# Patient Record
Sex: MALE | Race: WHITE | Employment: OTHER | URBAN - METROPOLITAN AREA
[De-identification: names, ages, dates, MRNs, and addresses within clinical notes are randomized per-mention and may not be internally consistent; named-entity substitution may affect disease eponyms.]

---

## 2023-11-02 PROBLEM — M16.10 HIP ARTHRITIS: Status: ACTIVE | Noted: 2023-11-02

## 2023-11-02 RX ORDER — TRAZODONE HYDROCHLORIDE 100 MG/1
TABLET ORAL
COMMUNITY

## 2023-11-02 RX ORDER — ATORVASTATIN CALCIUM 40 MG/1
TABLET, FILM COATED ORAL
COMMUNITY

## 2023-11-02 RX ORDER — HYDROCODONE BITARTRATE AND ACETAMINOPHEN 5; 325 MG/1; MG/1
TABLET ORAL
COMMUNITY
Start: 2021-08-02

## 2023-11-02 RX ORDER — CLOPIDOGREL BISULFATE 75 MG/1
1 TABLET ORAL DAILY
COMMUNITY

## 2023-11-02 RX ORDER — METOPROLOL SUCCINATE 50 MG/1
TABLET, EXTENDED RELEASE ORAL
COMMUNITY

## 2023-11-02 RX ORDER — METOPROLOL TARTRATE 25 MG/1
TABLET, FILM COATED ORAL
COMMUNITY

## 2023-11-02 RX ORDER — LEVOTHYROXINE SODIUM 50 UG/1
TABLET ORAL
COMMUNITY

## 2023-11-02 RX ORDER — TRAMADOL HYDROCHLORIDE 50 MG/1
TABLET ORAL
COMMUNITY
Start: 2021-08-02

## 2023-11-02 RX ORDER — ASPIRIN 81 MG/1
TABLET ORAL
COMMUNITY

## 2023-11-02 RX ORDER — OXYCODONE AND ACETAMINOPHEN 10; 325 MG/1; MG/1
TABLET ORAL
COMMUNITY

## 2023-11-02 RX ORDER — CHOLECALCIFEROL (VITAMIN D3) 50 MCG
TABLET ORAL
COMMUNITY

## 2023-11-03 ENCOUNTER — OFFICE VISIT (OUTPATIENT)
Dept: OTOLARYNGOLOGY | Facility: CLINIC | Age: 83
End: 2023-11-03
Payer: MEDICARE

## 2023-11-03 ENCOUNTER — CLINICAL SUPPORT (OUTPATIENT)
Dept: AUDIOLOGY | Facility: CLINIC | Age: 83
End: 2023-11-03
Payer: MEDICARE

## 2023-11-03 VITALS — WEIGHT: 117.2 LBS | HEIGHT: 65 IN | BODY MASS INDEX: 19.53 KG/M2 | TEMPERATURE: 96.9 F

## 2023-11-03 DIAGNOSIS — Z96.22 S/P TUBE MYRINGOTOMY: Primary | ICD-10-CM

## 2023-11-03 DIAGNOSIS — H90.A31 MIXED CONDUCTIVE AND SENSORINEURAL HEARING LOSS OF RIGHT EAR WITH RESTRICTED HEARING OF LEFT EAR: ICD-10-CM

## 2023-11-03 DIAGNOSIS — H69.91 DYSFUNCTION OF RIGHT EUSTACHIAN TUBE: Primary | ICD-10-CM

## 2023-11-03 DIAGNOSIS — H90.A31 MIXED CONDUCTIVE AND SENSORINEURAL HEARING LOSS OF RIGHT EAR WITH RESTRICTED HEARING OF LEFT EAR: Primary | ICD-10-CM

## 2023-11-03 DIAGNOSIS — H90.A22 SENSORINEURAL HEARING LOSS (SNHL) OF LEFT EAR WITH RESTRICTED HEARING OF RIGHT EAR: ICD-10-CM

## 2023-11-03 PROCEDURE — 1159F MED LIST DOCD IN RCRD: CPT | Performed by: OTOLARYNGOLOGY

## 2023-11-03 PROCEDURE — 69433 CREATE EARDRUM OPENING: CPT | Performed by: OTOLARYNGOLOGY

## 2023-11-03 PROCEDURE — 1036F TOBACCO NON-USER: CPT | Performed by: OTOLARYNGOLOGY

## 2023-11-03 PROCEDURE — 92550 TYMPANOMETRY & REFLEX THRESH: CPT | Performed by: AUDIOLOGIST

## 2023-11-03 PROCEDURE — 1125F AMNT PAIN NOTED PAIN PRSNT: CPT | Performed by: OTOLARYNGOLOGY

## 2023-11-03 PROCEDURE — 92557 COMPREHENSIVE HEARING TEST: CPT | Performed by: AUDIOLOGIST

## 2023-11-03 PROCEDURE — 1160F RVW MEDS BY RX/DR IN RCRD: CPT | Performed by: OTOLARYNGOLOGY

## 2023-11-03 PROCEDURE — 99203 OFFICE O/P NEW LOW 30 MIN: CPT | Performed by: OTOLARYNGOLOGY

## 2023-11-03 RX ORDER — OFLOXACIN 3 MG/ML
4 SOLUTION AURICULAR (OTIC) 2 TIMES DAILY
Qty: 5 ML | Refills: 0 | Status: SHIPPED | OUTPATIENT
Start: 2023-11-03 | End: 2023-11-10

## 2023-11-03 ASSESSMENT — ENCOUNTER SYMPTOMS
LOSS OF SENSATION IN FEET: 1
OCCASIONAL FEELINGS OF UNSTEADINESS: 1
DEPRESSION: 0

## 2023-11-03 ASSESSMENT — PAIN - FUNCTIONAL ASSESSMENT: PAIN_FUNCTIONAL_ASSESSMENT: 0-10

## 2023-11-03 ASSESSMENT — PAIN SCALES - GENERAL: PAINLEVEL_OUTOF10: 9

## 2023-11-03 ASSESSMENT — PATIENT HEALTH QUESTIONNAIRE - PHQ9
2. FEELING DOWN, DEPRESSED OR HOPELESS: NOT AT ALL
1. LITTLE INTEREST OR PLEASURE IN DOING THINGS: NOT AT ALL
SUM OF ALL RESPONSES TO PHQ9 QUESTIONS 1 & 2: 0

## 2023-11-03 NOTE — PROGRESS NOTES
AUDIOLOGY ADULT AUDIOMETRIC EVALUATION    Name:  Bennett Medina  :  1940  Age:  82 y.o.  Date of Evaluation:  November 3, 2023    Reason for visit: Mr. Medina is seen in the clinic today at the request of Segundo Lee MD in otolaryngology for an audiologic evaluation. Patient complains of feeling like his right ear is under water.    SCREENINGS  Steadi Fall Risk  One or more falls in the last year? No  Has trouble stepping onto curb? No  Advised to use a cane or walker to get around safely? No  Feels unsteady when walking? Yes  Has lost some feeling in feet? Yes  Often feels sad or depressed? No  Steadies self on furniture while walking at home? No  Takes medicine that makes them feel lightheaded or more tired than usual? No  Worried about Falling? No  Takes medicine to sleep or improve mood? Yes  Needs to push with hands when rising from a chair? Yes    Domestic Violence Screening  Are you currently or have you recently been threatened or abused physically, emotionally, or sexually by anyone? No  Do you feel UNSAFE going back to the place you are living? No    Pain Assessment  Pain Assessment: 0-10  Pain Score: 9  Pain Type: Chronic pain  Pain Location: Generalized-back      EVALUATION  See scanned audiogram: “Media” > “Audiology Report” > Document ID [id number].      RESULTS  Otoscopic Evaluation  Right Ear: minimal non-occluding cerumen with visualization of the tympanic membrane  Left Ear: minimal non-occluding cerumen with visualization of the tympanic membrane    Immittance Measures  Tympanometry:  Right Ear: Type B, reduced tympanic membrane mobility   Left Ear: Type A, normal tympanic membrane mobility with normal middle ear pressure     Acoustic Reflexes:  Ipsilateral Right Ear: absent from 500 Hz to 4000 Hz   Ipsilateral Left Ear: present and normal from 500 Hz to 2000 Hz, absent at 4000 Hz   Contralateral Right Ear: did not evaluate  Contralateral Left Ear: did not evaluate    Distortion  Product Otoacoustic Emissions (DPOAEs)  Right Ear: absent from 1000 Hz to 8000 Hz  Left Ear: absent from 1000 Hz to 8000 Hz    Audiometry  Test Technique and Reliability:   Standard audiometry via supra-aural headphones. Reliability is good.    Pure tone air and bone conduction audiometry:  Right Ear: mild to severe mixed hearing loss  Left Ear: normal hearing sensitivity through 250 Hz with a mild to moderately-severe sensorineural hearing loss in the higher frequencies     Speech Audiometry:  Speech Reception Threshold (SRT) Right Ear: 55 dB HL   Speech Reception Threshold (SRT) Left Ear: 45 dB HL   Word Recognition Score (WRS) Right Ear: Poor, 48% at 90 dB HL with 60 dB HL of masking  Word Recognition Score (WRS) Left Ear: Poor, 60% at 75 dB HL     IMPRESSIONS  Audiometric evaluation revealed a mixed hearing loss in the right ear, and a sensorineural hearing loss in the left ear. The presence of acoustic reflexes within normal intensity limits is consistent with normal middle ear and brainstem function, and suggests that auditory sensitivity is not significantly impaired. An absent acoustic reflex threshold is consistent with a middle ear disorder, hearing loss in the stimulated ear, and/or interruption of neural innervation of the stapedius muscle. Absent DPOAEs are consistent with abnormal cochlear outer hair cell function and some degree of hearing loss at those frequencies.    RECOMMENDATIONS  - Follow up with otolaryngology today as scheduled.  - Audiologic evaluation in conjunction with otologic care, if an acute change is noted, and/or annually.  - Continued hearing aid use.  - Follow-up with audiology for hearing aid checks and hearing aid adjustments as recommended. (Sees audiology elsewhere.)  - Follow-up with medical care team as planned.    PATIENT EDUCATION  Discussed results, impressions and recommendations with the patient. Questions were addressed and the patient was encouraged to contact our  office should concerns arise.    Time for this encounter: 5186-1938    Ewelina Cortez, CCC-A  Licensed Audiologist

## 2023-11-05 NOTE — PROGRESS NOTES
CORNEL Medina is a 82 y.o. male with long history of sensorineural hearing loss wearing hearing aids bilaterally.  He had been seeing another otolaryngologist.  He has had continued right greater than left discomfort and feeling like his right ear is underwater.  He had an audiogram today with air-bone gaps and mixed hearing loss on the right.  Type B tympanogram on the right.  He had sensorineural hearing loss on the left which was downward sloping.  He denies otalgia and otorrhea.      Past Medical History:   Diagnosis Date    Kidney disease     Old myocardial infarction     History of myocardial infarction            Medications:     Current Outpatient Medications:     aspirin 81 mg EC tablet, 1 tab(s) orally once a dayDo not resume until completion of Aspirin twice a day for 6 weeks, Disp: , Rfl:     atorvastatin (Lipitor) 40 mg tablet, 1 tablet Orally Once a day for 30 day(s), Disp: , Rfl:     cholecalciferol (Vitamin D-3) 50 MCG (2000 UT) tablet, 1 tablet Orally Once a day for 30 day(s), Disp: , Rfl:     HYDROcodone-acetaminophen (Norco) 5-325 mg tablet, 1 tab(s) orally, Disp: , Rfl:     levothyroxine (Synthroid, Levoxyl) 50 mcg tablet, 1 tablet po daily, Disp: , Rfl:     metoprolol succinate XL (Toprol-XL) 50 mg 24 hr tablet, Directions: 0.5 tablet oral daily, Disp: , Rfl:     traZODone (Desyrel) 100 mg tablet, 1 tablet at bedtime Orally Once a day for 30 day(s), Disp: , Rfl:     bamlanivimab 700 mg, etesevimab 1,400 mg in sodium chloride 0.9% 100 mL IVPB, as directed IV 1 dose over 1 hour, Disp: , Rfl:     clopidogrel (Plavix) 75 mg tablet, Take 1 tablet (75 mg) by mouth once daily., Disp: , Rfl:     metoprolol tartrate (Lopressor) 25 mg tablet, 1 tablet with food Orally Twice a day for 30 day(s), Disp: , Rfl:     ofloxacin (Floxin) 0.3 % otic solution, Administer 4 drops into affected ear(s) 2 times a day for 7 days., Disp: 5 mL, Rfl: 0    oxyCODONE-acetaminophen (Percocet)  mg tablet, 1 tablet  "as needed Orally every 6 hrs, Disp: , Rfl:     traMADol (Ultram) 50 mg tablet, 1 tab(s) orally every 6 hours, Disp: , Rfl:      Allergies:  Allergies   Allergen Reactions    Diclofenac Sodium Unknown    Penicillins Syncope     syncope        Physical Exam:  Last Recorded Vitals  Temperature 36.1 °C (96.9 °F), height 1.651 m (5' 5\"), weight 53.2 kg (117 lb 3.2 oz).  General:     General appearance: Well-developed, well-nourished in no acute distress.       Voice:  normal       Head/face: Normal appearance; nontender to palpation     Facial nerve function: Normal and symmetric bilaterally.    Oral/oropharynx:     Oral vestibule: Normal labial and gingival mucosa     Tongue/floor of mouth: Normal without lesion     Oropharynx: Clear.  No lesions present of the hard/soft palate, posterior pharynx    Neck:     Neck: Normal appearance, trachea midline     Salivary glands: Normal to palpation bilaterally     Lymph nodes: No cervical lymphadenopathy to palpation     Thyroid: No thyromegaly.  No palpable nodules     Range of motion: Normal    Neurological:     Cortical functions: Alert and oriented x3, appropriate affect       Larynx/hypopharynx:     Laryngeal findings: Mirror exam inadequate or limited secondary to enlarged base of tongue and/or excessive gagging    Ear:     Ear canal: Normal bilaterally     Tympanic membrane: Intact and mobile left, middle ear aerated.  Right side with serous effusion     Pinna: Normal bilaterally     Hearing:  Gross hearing assessment normal by voice    Nose:     Visualized using: Flexible nasal endoscopy utilized secondary to inadequate anterior rhinoscopy  Nasopharynx: Inadequate mirror examination as above, endoscopy demonstrates normal nasopharynx without obstruction or mass     Nasal dorsum: Nontraumatic midline appearance     Septum: Midline     Inferior turbinates: Normally sized     Mucosa: Bilateral, pink, normal appearing       Assessment/Plan   He has physical examination " findings and audiometric findings consistent with middle ear effusion on the right worsening his baseline sensorineural hearing loss.  He was offered myringotomy and tube placement on the right.  Verbal consent was obtained from the patient after a detailed discussion of the risks, goals, and alternatives including, but not limited to, bleeding, infection, tympanic membrane perforation, hearing loss, and failure to resolve symptoms or possible recurrence of symptoms requiring repeat or revision procedures.  The myringotomy was performed with topical anesthesia.  Serous fluid was aspirated from the middle ear space.  Collar-button tube was placed.  The patient tolerated well.  Floxin for a few days.  Recheck with repeat audiogram 4 weeks         Segundo Lee MD

## 2023-11-30 ENCOUNTER — OFFICE VISIT (OUTPATIENT)
Dept: OTOLARYNGOLOGY | Facility: CLINIC | Age: 83
End: 2023-11-30
Payer: MEDICARE

## 2023-11-30 VITALS — HEIGHT: 65 IN | WEIGHT: 110 LBS | TEMPERATURE: 97.5 F | BODY MASS INDEX: 18.33 KG/M2

## 2023-11-30 DIAGNOSIS — H69.91 DYSFUNCTION OF RIGHT EUSTACHIAN TUBE: Primary | ICD-10-CM

## 2023-11-30 DIAGNOSIS — H90.A31 MIXED CONDUCTIVE AND SENSORINEURAL HEARING LOSS OF RIGHT EAR WITH RESTRICTED HEARING OF LEFT EAR: ICD-10-CM

## 2023-11-30 PROCEDURE — 1036F TOBACCO NON-USER: CPT | Performed by: OTOLARYNGOLOGY

## 2023-11-30 PROCEDURE — 99213 OFFICE O/P EST LOW 20 MIN: CPT | Performed by: OTOLARYNGOLOGY

## 2023-11-30 PROCEDURE — 1125F AMNT PAIN NOTED PAIN PRSNT: CPT | Performed by: OTOLARYNGOLOGY

## 2023-11-30 PROCEDURE — 1160F RVW MEDS BY RX/DR IN RCRD: CPT | Performed by: OTOLARYNGOLOGY

## 2023-11-30 PROCEDURE — 1159F MED LIST DOCD IN RCRD: CPT | Performed by: OTOLARYNGOLOGY

## 2023-11-30 NOTE — PROGRESS NOTES
CORNEL Medina is a 83 y.o. male follow-up right tube.  He is doing well.  No pain or drainage.  Wearing hearing aids bilaterally.  He did not have an audiogram scheduled today as we had discussed but feels like he has improved    Prior history: With long history of sensorineural hearing loss wearing hearing aids bilaterally.  He had been seeing another otolaryngologist.  He has had continued right greater than left discomfort and feeling like his right ear is underwater.  He had an audiogram today with air-bone gaps and mixed hearing loss on the right.  Type B tympanogram on the right.  He had sensorineural hearing loss on the left which was downward sloping.  He denies otalgia and otorrhea.      Past Medical History:   Diagnosis Date    Kidney disease     Old myocardial infarction     History of myocardial infarction            Medications:     Current Outpatient Medications:     aspirin 81 mg EC tablet, 1 tab(s) orally once a day    Do not resume until completion of Aspirin twice a day for 6 weeks, Disp: , Rfl:     atorvastatin (Lipitor) 40 mg tablet, 1 tablet Orally Once a day for 30 day(s), Disp: , Rfl:     bamlanivimab 700 mg, etesevimab 1,400 mg in sodium chloride 0.9% 100 mL IVPB, as directed IV 1 dose over 1 hour, Disp: , Rfl:     cholecalciferol (Vitamin D-3) 50 MCG (2000 UT) tablet, 1 tablet Orally Once a day for 30 day(s), Disp: , Rfl:     clopidogrel (Plavix) 75 mg tablet, Take 1 tablet (75 mg) by mouth once daily., Disp: , Rfl:     HYDROcodone-acetaminophen (Norco) 5-325 mg tablet, 1 tab(s) orally, Disp: , Rfl:     levothyroxine (Synthroid, Levoxyl) 50 mcg tablet, 1 tablet po daily, Disp: , Rfl:     metoprolol succinate XL (Toprol-XL) 50 mg 24 hr tablet, Directions: 0.5 tablet oral daily, Disp: , Rfl:     metoprolol tartrate (Lopressor) 25 mg tablet, 1 tablet with food Orally Twice a day for 30 day(s), Disp: , Rfl:     oxyCODONE-acetaminophen (Percocet)  mg tablet, 1 tablet as needed Orally  "every 6 hrs, Disp: , Rfl:     traZODone (Desyrel) 100 mg tablet, 1 tablet at bedtime Orally Once a day for 30 day(s), Disp: , Rfl:     traMADol (Ultram) 50 mg tablet, 1 tab(s) orally every 6 hours, Disp: , Rfl:      Allergies:  Allergies   Allergen Reactions    Diclofenac Sodium Unknown    Penicillins Syncope     syncope        Physical Exam:  Last Recorded Vitals  Temperature 36.4 °C (97.5 °F), height 1.651 m (5' 5\"), weight 49.9 kg (110 lb).  General:     General appearance: Well-developed, well-nourished in no acute distress.       Voice:  normal       Head/face: Normal appearance; nontender to palpation     Facial nerve function: Normal and symmetric bilaterally.    Oral/oropharynx:     Oral vestibule: Normal labial and gingival mucosa     Tongue/floor of mouth: Normal without lesion     Oropharynx: Clear.  No lesions present of the hard/soft palate, posterior pharynx    Neck:     Neck: Normal appearance, trachea midline     Salivary glands: Normal to palpation bilaterally     Lymph nodes: No cervical lymphadenopathy to palpation     Thyroid: No thyromegaly.  No palpable nodules     Range of motion: Normal    Neurological:     Cortical functions: Alert and oriented x3, appropriate affect       Larynx/hypopharynx:     Laryngeal findings: Mirror exam inadequate or limited secondary to enlarged base of tongue and/or excessive gagging    Ear:     Ear canal: Normal bilaterally.  Wax removed from the right with suction     Tympanic membrane: Intact and mobile left, middle ear aerated.  Right side collar-button tube clean, dry, intact, patent     Pinna: Normal bilaterally     Hearing:  Gross hearing assessment normal by voice    Nose:     Visualized using: Flexible nasal endoscopy utilized secondary to inadequate anterior rhinoscopy  Nasopharynx: Inadequate mirror examination as above, endoscopy demonstrates normal nasopharynx without obstruction or mass     Nasal dorsum: Nontraumatic midline appearance     Septum: " Midline     Inferior turbinates: Normally sized     Mucosa: Bilateral, pink, normal appearing       Assessment/Plan   Doing very well with right collar-button tube.  Continue hearing aids and we will see him back in 4 months with an audiogram.  Water precautions discussed         Segundo Lee MD

## 2024-03-28 ENCOUNTER — APPOINTMENT (OUTPATIENT)
Dept: AUDIOLOGY | Facility: CLINIC | Age: 84
End: 2024-03-28
Payer: MEDICARE

## 2024-03-28 ENCOUNTER — CLINICAL SUPPORT (OUTPATIENT)
Dept: AUDIOLOGY | Facility: CLINIC | Age: 84
End: 2024-03-28
Payer: MEDICARE

## 2024-03-28 ENCOUNTER — OFFICE VISIT (OUTPATIENT)
Dept: OTOLARYNGOLOGY | Facility: CLINIC | Age: 84
End: 2024-03-28
Payer: MEDICARE

## 2024-03-28 VITALS — TEMPERATURE: 97.5 F | WEIGHT: 114 LBS | HEIGHT: 65 IN | BODY MASS INDEX: 18.99 KG/M2

## 2024-03-28 DIAGNOSIS — H93.13 TINNITUS OF BOTH EARS: ICD-10-CM

## 2024-03-28 DIAGNOSIS — H90.A22 SENSORINEURAL HEARING LOSS (SNHL) OF LEFT EAR WITH RESTRICTED HEARING OF RIGHT EAR: Primary | ICD-10-CM

## 2024-03-28 DIAGNOSIS — H90.3 BILATERAL SENSORINEURAL HEARING LOSS: ICD-10-CM

## 2024-03-28 DIAGNOSIS — H69.91 DYSFUNCTION OF RIGHT EUSTACHIAN TUBE: ICD-10-CM

## 2024-03-28 DIAGNOSIS — H69.91 DYSFUNCTION OF RIGHT EUSTACHIAN TUBE: Primary | ICD-10-CM

## 2024-03-28 PROCEDURE — 92557 COMPREHENSIVE HEARING TEST: CPT | Performed by: AUDIOLOGIST

## 2024-03-28 PROCEDURE — 92550 TYMPANOMETRY & REFLEX THRESH: CPT | Performed by: AUDIOLOGIST

## 2024-03-28 PROCEDURE — 1160F RVW MEDS BY RX/DR IN RCRD: CPT | Performed by: OTOLARYNGOLOGY

## 2024-03-28 PROCEDURE — 1159F MED LIST DOCD IN RCRD: CPT | Performed by: OTOLARYNGOLOGY

## 2024-03-28 PROCEDURE — 99213 OFFICE O/P EST LOW 20 MIN: CPT | Performed by: OTOLARYNGOLOGY

## 2024-03-28 PROCEDURE — 1036F TOBACCO NON-USER: CPT | Performed by: OTOLARYNGOLOGY

## 2024-03-28 ASSESSMENT — PATIENT HEALTH QUESTIONNAIRE - PHQ9
1. LITTLE INTEREST OR PLEASURE IN DOING THINGS: NOT AT ALL
2. FEELING DOWN, DEPRESSED OR HOPELESS: NOT AT ALL
SUM OF ALL RESPONSES TO PHQ9 QUESTIONS 1 & 2: 0

## 2024-03-28 NOTE — PROGRESS NOTES
CORNEL Medina is a 83 y.o. male follow-up right tube.  He is doing well.  Last seen November 2023.  No pain or drainage.  Wearing hearing aids bilaterally.  Audiogram today shows closure of the air-bone gaps.  Symmetric sensorineural hearing loss.  Large volume tympanogram on the right    Prior history: With long history of sensorineural hearing loss wearing hearing aids bilaterally.  He had been seeing another otolaryngologist.  He has had continued right greater than left discomfort and feeling like his right ear is underwater.  He had an audiogram today with air-bone gaps and mixed hearing loss on the right.  Type B tympanogram on the right.  He had sensorineural hearing loss on the left which was downward sloping.  He denies otalgia and otorrhea.      Past Medical History:   Diagnosis Date    Kidney disease     Old myocardial infarction     History of myocardial infarction            Medications:     Current Outpatient Medications:     aspirin 81 mg EC tablet, 1 tab(s) orally once a day    Do not resume until completion of Aspirin twice a day for 6 weeks, Disp: , Rfl:     atorvastatin (Lipitor) 40 mg tablet, 1 tablet Orally Once a day for 30 day(s), Disp: , Rfl:     bamlanivimab 700 mg, etesevimab 1,400 mg in sodium chloride 0.9% 100 mL IVPB, as directed IV 1 dose over 1 hour, Disp: , Rfl:     cholecalciferol (Vitamin D-3) 50 MCG (2000 UT) tablet, 1 tablet Orally Once a day for 30 day(s), Disp: , Rfl:     clopidogrel (Plavix) 75 mg tablet, Take 1 tablet (75 mg) by mouth once daily., Disp: , Rfl:     HYDROcodone-acetaminophen (Norco) 5-325 mg tablet, 1 tab(s) orally, Disp: , Rfl:     levothyroxine (Synthroid, Levoxyl) 50 mcg tablet, 1 tablet po daily, Disp: , Rfl:     metoprolol succinate XL (Toprol-XL) 50 mg 24 hr tablet, Directions: 0.5 tablet oral daily, Disp: , Rfl:     metoprolol tartrate (Lopressor) 25 mg tablet, 1 tablet with food Orally Twice a day for 30 day(s), Disp: , Rfl:      oxyCODONE-acetaminophen (Percocet)  mg tablet, 1 tablet as needed Orally every 6 hrs, Disp: , Rfl:     traMADol (Ultram) 50 mg tablet, 1 tab(s) orally every 6 hours, Disp: , Rfl:     traZODone (Desyrel) 100 mg tablet, 1 tablet at bedtime Orally Once a day for 30 day(s), Disp: , Rfl:      Allergies:  Allergies   Allergen Reactions    Diclofenac Sodium Unknown    Penicillins Syncope     syncope        Physical Exam:  Last Recorded Vitals  There were no vitals taken for this visit.  General:     General appearance: Well-developed, well-nourished in no acute distress.       Voice:  normal       Head/face: Normal appearance; nontender to palpation     Facial nerve function: Normal and symmetric bilaterally.    Oral/oropharynx:     Oral vestibule: Normal labial and gingival mucosa     Tongue/floor of mouth: Normal without lesion     Oropharynx: Clear.  No lesions present of the hard/soft palate, posterior pharynx    Neck:     Neck: Normal appearance, trachea midline     Salivary glands: Normal to palpation bilaterally     Lymph nodes: No cervical lymphadenopathy to palpation     Thyroid: No thyromegaly.  No palpable nodules     Range of motion: Normal    Neurological:     Cortical functions: Alert and oriented x3, appropriate affect       Larynx/hypopharynx:     Laryngeal findings: Mirror exam inadequate or limited secondary to enlarged base of tongue and/or excessive gagging    Ear:     Ear canal: Normal bilaterally.  Wax removed from the right with wire-loop     Tympanic membrane: Intact and mobile left, middle ear aerated.  Right side collar-button tube clean, dry, intact, patent     Pinna: Normal bilaterally     Hearing:  Gross hearing assessment normal by voice    Nose:     Visualized using: Anterior rhinoscopy     nasal dorsum: Nontraumatic midline appearance     Septum: Midline     Inferior turbinates: Normally sized     Mucosa: Bilateral, pink, normal appearing       Assessment/Plan   Doing very well with  right collar-button tube.  Continue hearing aids and recheck 6 months, sooner as needed       Segundo Lee MD

## 2024-03-28 NOTE — PROGRESS NOTES
AUDIOLOGY ADULT AUDIOMETRIC EVALUATION    Name:  Bennett Medina  :  1940  Age:  83 y.o.  Date of Evaluation:  2024    Reason for visit: Mr. Medina is seen in the clinic today at the request of otolaryngology for an audiologic evaluation.     HISTORY  Patient complains of hearing loss long term , tinnitus and worked in noise.  He reported having a tube in right ear in recent past. He wears hearing aids and denies dizzy and fullness.    EVALUATION  See scanned audiogram: “Media” > “Audiology Report”.      RESULTS  Otoscopic Evaluation:  Right Ear: clear ear canal  Left Ear: clear ear canal    Immittance Measures:  Tympanometry:  Right Ear: Type B, reduced tympanic membrane mobility  . Large volume   Left Ear: Type A, normal tympanic membrane mobility with normal middle ear pressure     Acoustic Reflexes:  Ipsilateral Right Ear: NR NR NR NR   Ipsilateral Left Ear:   NR NR NR NR   Contralateral Right Ear: did not evaluate  Contralateral Left Ear: did not evaluate      Audiometry:  Test Technique and Reliability:  BEHAVIORAL   Standard audiometry via supra-aural headphones. Reliability is  FAIR , FALSE POSITIVES TOgood.    Pure tone air and bone conduction audiometry:  Right Ear: MOSTLY MILD MODERATELY SEVERE SNHL .IMPROVED FROM LAST AUDIOGRAM.   Left Ear: MILD MODERATE TO SEVERE SNHL. STABLE FROM LAST AUDIOGRAM.    Speech Audiometry (Word Recognition Scores):   Right Ear:  44% POOR  Left Ear:     60% POOR    IMPRESSIONS   MILD MODERATE SEVERE SNHL WITH POOR WRS. RIGHT IMPORVED TODAY.  The presence of acoustic reflexes within normal intensity limits is consistent with normal middle ear and brainstem function, and suggests that auditory sensitivity is not significantly impaired. An elevated or absent acoustic reflex threshold is consistent with a middle ear disorder, hearing loss in the stimulated ear, and/or interruption of neural innervation of the stapedius muscle. Present DPOAEs suggest  normal/near normal cochlear outer hair cell function and are consistent with no greater than a mild hearing loss at those frequencies. Absent DPOAEs are consistent with abnormal cochlear outer hair cell function and some degree of hearing loss at those frequencies.    RECOMMENDATIONS  - Follow up with otolaryngology today as scheduled.  - Audiologic evaluation as needed.  - Annual audiologic evaluation, sooner if an acute change is noted.  - Audiologic evaluation in conjunction with otologic care, if an acute change is noted, and/or annually.  -- Continued hearing aid use.  - Follow-up with audiology annually for routine hearing aid maintenance, sooner if questions/problems arise.  - Follow-up with medical care team as planned.    PATIENT EDUCATION  Discussed results, impressions and recommendations with the patient. Questions were addressed and the patient was encouraged to contact our office should concerns arise.    Time for this encounter:40 MINUTES    Ewelina Murillo  Licensed Audiologist

## 2024-09-27 ENCOUNTER — APPOINTMENT (OUTPATIENT)
Dept: OTOLARYNGOLOGY | Facility: CLINIC | Age: 84
End: 2024-09-27
Payer: MEDICARE